# Patient Record
Sex: FEMALE | Race: WHITE | NOT HISPANIC OR LATINO | Employment: UNEMPLOYED | URBAN - METROPOLITAN AREA
[De-identification: names, ages, dates, MRNs, and addresses within clinical notes are randomized per-mention and may not be internally consistent; named-entity substitution may affect disease eponyms.]

---

## 2021-12-02 ENCOUNTER — HOSPITAL ENCOUNTER (EMERGENCY)
Facility: CLINIC | Age: 51
Discharge: HOME | End: 2021-12-02
Admitting: PHYSICIAN ASSISTANT
Payer: COMMERCIAL

## 2021-12-02 VITALS
HEIGHT: 61 IN | BODY MASS INDEX: 41.54 KG/M2 | HEART RATE: 98 BPM | WEIGHT: 220 LBS | OXYGEN SATURATION: 98 % | RESPIRATION RATE: 18 BRPM | DIASTOLIC BLOOD PRESSURE: 74 MMHG | SYSTOLIC BLOOD PRESSURE: 117 MMHG | TEMPERATURE: 98.6 F

## 2021-12-02 DIAGNOSIS — M75.81 ROTATOR CUFF TENDINITIS, RIGHT: ICD-10-CM

## 2021-12-02 DIAGNOSIS — M54.12 CERVICAL RADICULOPATHY: ICD-10-CM

## 2021-12-02 PROCEDURE — 99283 EMERGENCY DEPT VISIT LOW MDM: CPT

## 2021-12-02 PROCEDURE — 250N000013 HC RX MED GY IP 250 OP 250 PS 637: Performed by: PHYSICIAN ASSISTANT

## 2021-12-02 RX ORDER — ACETAMINOPHEN 325 MG/1
975 TABLET ORAL ONCE
Status: COMPLETED | OUTPATIENT
Start: 2021-12-02 | End: 2021-12-02

## 2021-12-02 RX ORDER — PREDNISONE 20 MG/1
TABLET ORAL
Qty: 10 TABLET | Refills: 0 | Status: SHIPPED | OUTPATIENT
Start: 2021-12-02

## 2021-12-02 RX ADMIN — ACETAMINOPHEN 975 MG: 325 TABLET ORAL at 16:18

## 2021-12-02 ASSESSMENT — ENCOUNTER SYMPTOMS
FATIGUE: 0
NEUROLOGICAL NEGATIVE: 1
FEVER: 0
ABDOMINAL PAIN: 0
VOMITING: 0
NAUSEA: 1
CHILLS: 0
SHORTNESS OF BREATH: 0
COUGH: 1
DIARRHEA: 0
PSYCHIATRIC NEGATIVE: 1
MUSCULOSKELETAL NEGATIVE: 1

## 2021-12-02 ASSESSMENT — MIFFLIN-ST. JEOR: SCORE: 1554.25

## 2021-12-02 NOTE — DISCHARGE INSTRUCTIONS
Please take the prednisone as written.  Follow-up through spine care and orthopedics in the next 10 days after you finish your 14-day quarantine from a Covid infection for outpatient follow-up.

## 2021-12-02 NOTE — ED PROVIDER NOTES
EMERGENCY DEPARTMENT ENCOUNTER      NAME: Buddy Sinclair  AGE: 51 year old female  YOB: 1970  MRN: [id number]  EVALUATION DATE & TIME: 12/2/2021  4:14 PM    PCP: No primary care provider on file.    ED PROVIDER: Robin Plummer PA-C      Chief Complaint   Patient presents with     Back Pain     Hand Pain         FINAL IMPRESSION:  1. Cervical radiculopathy    2. Rotator cuff tendinitis, right          MEDICAL DECISION MAKING:    Pertinent Labs & Imaging studies reviewed. (See chart for details)  51 year old female presents to the Emergency Department for evaluation of right shoulder pain, paresthesias in the hands.    After obtaining full history of present illness briefly examining the patient I suspect that the patient may be suffering from a rotator cuff tendinitis and may have some spinal nerve impingement that is causing some cervical radiculopathy.  Overall I did not feel there is emergent indication for laboratory testing or emergent MRI imaging at this time as there is no definitive motor weakness.  Patient is afebrile, no recent reports of trauma.  I did facilitate referrals as an outpatient through orthopedics and spine care and also will initiate a 5-day course of prednisone to treat her symptoms.  I also recommended ice.  Overall patient is happy with this plan of care.  She knows that she can return to the ED if there is new or worsening symptoms.    With regards to the COVID-19 infection does sound as if she is doing fairly well with this there is no significant shortness of breath or hypoxia noted.        ED COURSE    I met with the patient, obtained history, performed an initial exam, and discussed options and plan for diagnostics and treatment here in the ED.    At the conclusion of the encounter I discussed the results of all of the tests and the disposition. The questions were answered. The patient or family acknowledged understanding and was agreeable with the care plan.      MEDICATIONS GIVEN IN THE EMERGENCY:  Medications   acetaminophen (TYLENOL) tablet 975 mg (975 mg Oral Given 12/2/21 1618)       NEW PRESCRIPTIONS STARTED AT TODAY'S ER VISIT  New Prescriptions    PREDNISONE (DELTASONE) 20 MG TABLET    Take two tablets (= 40mg) each day for 5 (five) days            =================================================================    HPI    Patient information was obtained from: Patient      Buddy Sinclair is a 51 year old female with a pertinent history of diagnosis of COVID-19 infection, has been symptomatic for 6 days, who presents to this ED for evaluation of complaints of right shoulder and back pain as well as subjective numbness and tingling in her fourth and fifth digits of the hands.  Patient denies any acute trauma.  Patient symptoms of the shoulder and the neck pain have been irritated over the last 3 to 4 days.  No reports of overexertion.  She is right-hand dominant.  With regards to the right shoulder pain symptoms seem to worsen with movement and touch as well as with internal and external rotation.  No previous history of surgery to the shoulder.    With regards to the patient's numbness and tingling patient does admit to having cervical spinal stenosis.  She has had similar symptoms in the past where MRIs diagnosis.  No specific new weakness but again is having intermittent numbness and tingling involving the fourth and fifth digits of her bilateral hands.  There is no reports of fever chills again no reports of specific neck trauma or whiplash.  It has been many years since she is followed up through the spine clinic.  No specific weakness in the upper extremities.    Additionally to all this patient has been diagnosed with COVID-19, she had a positive test 3 days ago.  She is experiencing a very mild cough only short of breath when she is up moving around slight nausea but no vomiting no diarrhea.  She denies any fever or body aches.  Overall she seems to be  doing well with a viral infection.  No reports of abdominal pain.  None of the shoulder symptoms seem to worsen with eating.      REVIEW OF SYSTEMS   Review of Systems   Constitutional: Negative for chills, fatigue and fever.   HENT: Negative.    Respiratory: Positive for cough. Negative for shortness of breath.    Cardiovascular: Negative for chest pain.   Gastrointestinal: Positive for nausea. Negative for abdominal pain, diarrhea and vomiting.   Genitourinary: Negative.    Musculoskeletal: Negative.         Positive for right shoulder pain   Skin: Negative.    Neurological: Negative.    Psychiatric/Behavioral: Negative.    All other systems reviewed and are negative.         PAST MEDICAL HISTORY:  No past medical history on file.    PAST SURGICAL HISTORY:  No past surgical history on file.      CURRENT MEDICATIONS:    No current facility-administered medications for this encounter.    Current Outpatient Medications:      predniSONE (DELTASONE) 20 MG tablet, Take two tablets (= 40mg) each day for 5 (five) days, Disp: 10 tablet, Rfl: 0      ALLERGIES:  No Known Allergies    FAMILY HISTORY:  No family history on file.    SOCIAL HISTORY:   Social History     Socioeconomic History     Marital status:      Spouse name: Not on file     Number of children: Not on file     Years of education: Not on file     Highest education level: Not on file   Occupational History     Not on file   Tobacco Use     Smoking status: Not on file     Smokeless tobacco: Not on file   Substance and Sexual Activity     Alcohol use: Not on file     Drug use: Not on file     Sexual activity: Not on file   Other Topics Concern     Not on file   Social History Narrative     Not on file     Social Determinants of Health     Financial Resource Strain: Not on file   Food Insecurity: Not on file   Transportation Needs: Not on file   Physical Activity: Not on file   Stress: Not on file   Social Connections: Not on file   Intimate Partner  "Violence: Not on file   Housing Stability: Not on file       VITALS:  Patient Vitals for the past 24 hrs:   BP Temp Temp src Pulse Resp SpO2 Height Weight   12/02/21 1620 117/74 -- -- 98 18 98 % -- --   12/02/21 1247 111/80 98.6  F (37  C) Oral 99 16 93 % 1.556 m (5' 1.25\") 99.8 kg (220 lb)       PHYSICAL EXAM    Physical Exam  Vitals and nursing note reviewed.   Constitutional:       General: She is not in acute distress.     Appearance: She is normal weight. She is not toxic-appearing.   HENT:      Right Ear: External ear normal.      Left Ear: External ear normal.      Nose: Nose normal.   Eyes:      Conjunctiva/sclera: Conjunctivae normal.   Cardiovascular:      Rate and Rhythm: Normal rate.   Pulmonary:      Effort: Pulmonary effort is normal. No respiratory distress.      Breath sounds: No stridor.   Musculoskeletal:      Cervical back: Normal range of motion and neck supple. No rigidity or tenderness.      Comments: Examination of the right shoulder does reveal localized pain to the posterior right shoulder blade.  Symptoms worsen with any type of movement of the shoulder itself.  In particular internal and external rotation of the shoulder seems to cause pain in this area.  These findings would have any concern for a rotator cuff tendinitis.  When examining sensation and  strength of the hands, it appears that there is equal bilateral strength of  and overall strength of the arms bilaterally in the form of shoulder abduction, shoulder adduction, flexion extension at the elbows bilaterally.  She does report subjective tingling into the fourth and fifth digits of the hands, this seems to come and go.  No focal tenderness in the cervical spine.  Axial loading and axial traction of the cervical spine did not seem to change her symptoms at all.  No complaints of back pain.  She denies any pain in the lower extremities, no complaints of difficulty walking.  No gross evidence of trauma.   Lymphadenopathy: "      Cervical: No cervical adenopathy.   Skin:     General: Skin is warm and dry.   Neurological:      General: No focal deficit present.      Mental Status: Mental status is at baseline.      Motor: No weakness.   Psychiatric:         Mood and Affect: Mood normal.         Behavior: Behavior normal.          LAB:  All pertinent labs reviewed and interpreted.       RADIOLOGY:  Reviewed all pertinent imaging. Please see official radiology report.  No orders to display               Robin Plummer PA-C  Emergency Medicine  [facility]     Robin Plummer PA-C  12/02/21 9313

## 2021-12-02 NOTE — ED TRIAGE NOTES
Arrives to ED with c/o R scapula pain that has been going on 2-3 months. Worse 2-3 days. Pain now radiating down RLE. Reports new numbness to pinky of R hand. Denies injury. Pt also c/o stabbing pain to L hand 2-3 months as well. Worse last 2-3 days.